# Patient Record
Sex: MALE | Race: WHITE | NOT HISPANIC OR LATINO | Employment: OTHER | URBAN - METROPOLITAN AREA
[De-identification: names, ages, dates, MRNs, and addresses within clinical notes are randomized per-mention and may not be internally consistent; named-entity substitution may affect disease eponyms.]

---

## 2023-05-28 ENCOUNTER — HOSPITAL ENCOUNTER (EMERGENCY)
Facility: MEDICAL CENTER | Age: 73
End: 2023-05-28
Attending: EMERGENCY MEDICINE
Payer: MEDICARE

## 2023-05-28 VITALS
DIASTOLIC BLOOD PRESSURE: 67 MMHG | TEMPERATURE: 98.1 F | BODY MASS INDEX: 27.96 KG/M2 | HEIGHT: 71 IN | WEIGHT: 199.74 LBS | HEART RATE: 64 BPM | OXYGEN SATURATION: 93 % | RESPIRATION RATE: 16 BRPM | SYSTOLIC BLOOD PRESSURE: 149 MMHG

## 2023-05-28 DIAGNOSIS — H53.9 VISION CHANGES: ICD-10-CM

## 2023-05-28 DIAGNOSIS — H33.21 RETINAL DETACHMENT, RIGHT: ICD-10-CM

## 2023-05-28 PROCEDURE — 99284 EMERGENCY DEPT VISIT MOD MDM: CPT

## 2023-05-28 PROCEDURE — 700101 HCHG RX REV CODE 250: Performed by: EMERGENCY MEDICINE

## 2023-05-28 RX ORDER — PHENYLEPHRINE HYDROCHLORIDE 25 MG/ML
1 SOLUTION/ DROPS OPHTHALMIC
Status: COMPLETED | OUTPATIENT
Start: 2023-05-28 | End: 2023-05-28

## 2023-05-28 RX ORDER — PROPARACAINE HYDROCHLORIDE 5 MG/ML
1 SOLUTION/ DROPS OPHTHALMIC ONCE
Status: COMPLETED | OUTPATIENT
Start: 2023-05-28 | End: 2023-05-28

## 2023-05-28 RX ORDER — TROPICAMIDE 10 MG/ML
1 SOLUTION/ DROPS OPHTHALMIC ONCE
Status: COMPLETED | OUTPATIENT
Start: 2023-05-28 | End: 2023-05-28

## 2023-05-28 RX ADMIN — TROPICAMIDE 1 DROP: 10 SOLUTION/ DROPS OPHTHALMIC at 20:00

## 2023-05-28 RX ADMIN — PHENYLEPHRINE HYDROCHLORIDE 1 DROP: 25 SOLUTION/ DROPS OPHTHALMIC at 20:02

## 2023-05-28 RX ADMIN — PROPARACAINE HYDROCHLORIDE 1 DROP: 5 SOLUTION/ DROPS OPHTHALMIC at 18:43

## 2023-05-28 NOTE — ED TRIAGE NOTES
"Amb to triage, sent from the ER in [place] for further eval.  Patient  c/o blurred vision to R eye x 2 days.  Patient describes it as a \"moving bubble in my vision\".  Denies any other symptoms.   "

## 2023-05-29 NOTE — ED PROVIDER NOTES
ER Provider Note    Scribed for Rojas Garcia D.O. by Anabell Hatch. 5/28/2023  5:29 PM    Primary Care Provider: Pcp Unknown    CHIEF COMPLAINT  Chief Complaint   Patient presents with    Blurred Vision       HPI/ROS    Daisy Hall is a 73 y.o. male who presents to the Emergency Department for evaluation of altered vision onset Friday. Patient woke up on Friday with a floater in his right eye. He describes it as a bubble at the bottom of his right eye. The bubble remains in the same spot at the bottom of the right eye, however it swells and shrinks. When it swells, it takes up almost 3/4 of his vision. He states that the bubble was larger than normal yesterday. When he looks down, it looks as if he is looking through a bubble. Daisy went to Urgent Care in [place]. They measured his vision. He denies eye pain, blurry vision, or left eye problems. No alleviating factors were reported. Patient wears glasses for reading and distance. He has had bilateral cataract surgery 5 years ago.     ROS as per HPI.    PAST MEDICAL HISTORY  Past Medical History:   Diagnosis Date    Diabetes (HCC)     Hypertension        SURGICAL HISTORY  Past Surgical History:   Procedure Laterality Date    OTHER ABDOMINAL SURGERY  7/14/2011    Lap appy    APPENDECTOMY LAPAROSCOPIC  7/14/2011    Performed by DAISY GUDINO at SURGERY [facility] ORS    OTHER  1992    Scalp surgery       FAMILY HISTORY  History reviewed. No pertinent family history.    SOCIAL HISTORY   reports that he quit smoking about 15 years ago. His smoking use included cigarettes. He does not have any smokeless tobacco history on file. He reports current alcohol use. He reports that he does not use drugs.    CURRENT MEDICATIONS  Previous Medications    ATENOLOL (TENORMIN) 50 MG TABS    Take 50 mg by mouth every day.    CIPROFLOXACIN (CIPRO) 500 MG TABS    Take 500 mg by mouth 2 times a day.    HYDROCHLOROTHIAZIDE (HYDRODIURIL) 25 MG TABS    Take 25 mg by mouth every  "day. Pt takes Hyzaar- Losartan-hctz 100-25mg tab    LOSARTAN (COZAAR) 50 MG TABS    Take 100 mg by mouth every day. Pt takes Hyzaar - losartan-hctz 100-25mg tab     OXYCODONE-ACETAMINOPHEN (PERCOCET) 5-325 MG TABS    Take 1-2 Tabs by mouth every four hours as needed.       ALLERGIES  Patient has no known allergies.    PHYSICAL EXAM  /78   Pulse 77   Temp 36.6 °C (97.8 °F) (Temporal)   Resp 16   Ht 1.803 m (5' 11\")   Wt 90.6 kg (199 lb 11.8 oz)   SpO2 96%   BMI 27.86 kg/m²     General: No acute distress.  HENT: Normocephalic, Mucus membranes are moist. Right eye red reflex is present when looking up. But when looking down, red reflex is completely absent. Cannot see vessels in that area.   Neuro: Awake, Conversive, Able to relay recent events.  Psychiatric: Calm and cooperative.     EXTERNAL RECORDS REVIEWED  Previous record reviewed show no ophthalmology visits.     INITIAL ASSESSMENT  Patient has a history of lens replacement several years ago. Since Friday, he noticed visual changes in lower field of vision that he describes as a bubble. Red eye reflex is present when looking up but completely absence when looking down, upon retina exam. Will discuss with ophthalmology.     ED Observation Status? No; Patient does not meet criteria for ED Observation.     COURSE & MEDICAL DECISION MAKING     COURSE AND PLAN  5:29 PM - Patient seen and examined at bedside. Discussed plan of care, including evaluation by specialist. Patient agrees to the plan of care.     6:43 PM - Performed tonometry eye exam. IOP 17. Call is out to ophthalmology.     7:36 PM I discussed the patient's case and the above findings with Dr. Menendez (Opthalmology) who will evaluate the patient. Will place dilating drops.      9:31 PM - Patient was evaluated by Dr. Menendez. He has a retinal detachment. He has an appointment at 9AM with the retinal specialist.     ED Summary: Presented with altered vision.    On exam there is concerns for " detachment versus hemorrhage versus mass.  Spoke with ophthalmologist, Dr. Menendez came in to evaluate the patient.  She made arrangements for the patient to be evaluated by a retinal specialist tomorrow.  The patient is stable for discharge home.       The patient will return for new or worsening symptoms and is stable at the time of discharge.    The patient is referred to a primary physician for blood pressure management, diabetic screening, and for all other preventative health concerns.      DISPOSITION:  Patient will be discharged home in stable condition.    FOLLOW UP:    See retinal specialist as arranged by Dr. Matute  In 1 day            FINAL DIAGNOSIS  1. Vision changes    2. Retinal detachment, right         Anabell CLEARY (Keren), am scribing for, and in the presence of, Rojas Garcia D.O..    Electronically signed by: Anabell Hatch (Keren), 5/28/2023    Rojas CLEARY D.O. personally performed the services described in this documentation, as scribed by Anabell Hatch in my presence, and it is both accurate and complete.    The note accurately reflects work and decisions made by me.  Rojas Garcia D.O.  5/28/2023  10:07 PM     Discarded in the usual manner

## 2023-05-29 NOTE — ED NOTES
Visual acuity done on pt w/eye glasses on. Left Eye: 20/40; Right Eye: UNABLE TO SEE. Both Eyes: 20/30

## 2023-05-30 NOTE — CONSULTS
DATE OF SERVICE:  05/28/2023     SUBJECTIVE:  The patient is a very nice 73-year-old gentleman who noted   roughly 1-1/2 days ago that he was losing part of his lower visual field, R eye.  The   field defect has been worsening in the right eye.     PHYSICAL EXAMINATION:  On examination, visual acuity was 20/400 in the right   eye.  Anterior segment examination was unremarkable.  He was pseudophakic.    Dilated examination showed a large superior retinal detachment OD     IMPRESSION:  Large superior retinal detachment.OD     RECOMMENDATIONS:  Findings were discussed with Dr. Joby Jacinto who will   see the patient early tomorrow morning.  He is to lie flat in bed with   bathroom privileges until then if at all possible. He will give our office a   call if he notes significant changes.        ______________________________  SONG POOLE MD    [facility]    DD:  05/29/2023 19:04  DT:  05/29/2023 19:27    Job#:  [id number]